# Patient Record
Sex: FEMALE | Race: WHITE | Employment: STUDENT | URBAN - NONMETROPOLITAN AREA
[De-identification: names, ages, dates, MRNs, and addresses within clinical notes are randomized per-mention and may not be internally consistent; named-entity substitution may affect disease eponyms.]

---
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
## 2025-01-31 ENCOUNTER — OFFICE VISIT (OUTPATIENT)
Dept: FAMILY MEDICINE CLINIC | Age: 20
End: 2025-01-31
Payer: COMMERCIAL

## 2025-01-31 VITALS
WEIGHT: 177 LBS | TEMPERATURE: 97.9 F | BODY MASS INDEX: 30.38 KG/M2 | HEART RATE: 92 BPM | DIASTOLIC BLOOD PRESSURE: 82 MMHG | OXYGEN SATURATION: 98 % | SYSTOLIC BLOOD PRESSURE: 110 MMHG

## 2025-01-31 DIAGNOSIS — R09.89 SYMPTOMS OF UPPER RESPIRATORY INFECTION (URI): Primary | ICD-10-CM

## 2025-01-31 PROCEDURE — 99213 OFFICE O/P EST LOW 20 MIN: CPT

## 2025-01-31 RX ORDER — ACETAMINOPHEN 500 MG
500 TABLET ORAL 4 TIMES DAILY PRN
Qty: 120 TABLET | Refills: 0 | Status: SHIPPED | OUTPATIENT
Start: 2025-01-31

## 2025-01-31 RX ORDER — FLUTICASONE PROPIONATE 50 MCG
1 SPRAY, SUSPENSION (ML) NASAL DAILY
Qty: 1 EACH | Refills: 0 | Status: SHIPPED | OUTPATIENT
Start: 2025-01-31

## 2025-01-31 RX ORDER — OXYMETAZOLINE HYDROCHLORIDE 0.05 G/100ML
2 SPRAY NASAL 2 TIMES DAILY
Qty: 1 EACH | Refills: 0 | Status: SHIPPED | OUTPATIENT
Start: 2025-01-31 | End: 2026-01-31

## 2025-01-31 RX ORDER — CETIRIZINE HYDROCHLORIDE 10 MG/1
10 TABLET ORAL DAILY
Qty: 30 TABLET | Refills: 0 | Status: SHIPPED | OUTPATIENT
Start: 2025-01-31

## 2025-01-31 RX ORDER — BENZONATATE 200 MG/1
200 CAPSULE ORAL 3 TIMES DAILY PRN
Qty: 30 CAPSULE | Refills: 0 | Status: SHIPPED | OUTPATIENT
Start: 2025-01-31 | End: 2025-02-10

## 2025-01-31 SDOH — ECONOMIC STABILITY: FOOD INSECURITY: WITHIN THE PAST 12 MONTHS, THE FOOD YOU BOUGHT JUST DIDN'T LAST AND YOU DIDN'T HAVE MONEY TO GET MORE.: NEVER TRUE

## 2025-01-31 SDOH — ECONOMIC STABILITY: FOOD INSECURITY: WITHIN THE PAST 12 MONTHS, YOU WORRIED THAT YOUR FOOD WOULD RUN OUT BEFORE YOU GOT MONEY TO BUY MORE.: NEVER TRUE

## 2025-01-31 ASSESSMENT — PATIENT HEALTH QUESTIONNAIRE - PHQ9
SUM OF ALL RESPONSES TO PHQ QUESTIONS 1-9: 0
1. LITTLE INTEREST OR PLEASURE IN DOING THINGS: NOT AT ALL
2. FEELING DOWN, DEPRESSED OR HOPELESS: NOT AT ALL
SUM OF ALL RESPONSES TO PHQ9 QUESTIONS 1 & 2: 0
SUM OF ALL RESPONSES TO PHQ QUESTIONS 1-9: 0

## 2025-01-31 ASSESSMENT — ENCOUNTER SYMPTOMS
WHEEZING: 0
CHOKING: 0
TROUBLE SWALLOWING: 0
SHORTNESS OF BREATH: 0
COUGH: 1
CHEST TIGHTNESS: 0
VOICE CHANGE: 0
RHINORRHEA: 0
SINUS PRESSURE: 0
APNEA: 0
SINUS PAIN: 0
SORE THROAT: 1

## 2025-01-31 NOTE — PROGRESS NOTES
Malathi Allison (:  2005) is a 19 y.o. female,Established patient, here for evaluation of the following chief complaint(s):  Fever (Chills, slight cough started yesterday but feels better today.)         Assessment & Plan  Symptoms of upper respiratory infection (URI)   Acute condition, new, Supportive care with appropriate antipyretics and fluids. 1 day of fever/chils, nonprouctive cough, already improving, noted sinus congestion, had not tried anything at this time. Suspecte viral URI, start symptomatic treatment only, if not improved in few days, consider augmentin for sinusitis. Clinical course discussed with patient that may take up to 6 weeks to recover from.    Orders:    cetirizine (ZYRTEC) 10 MG tablet; Take 1 tablet by mouth daily    benzonatate (TESSALON) 200 MG capsule; Take 1 capsule by mouth 3 times daily as needed for Cough    fluticasone (FLONASE) 50 MCG/ACT nasal spray; 1 spray by Each Nostril route daily    oxymetazoline (12 HOUR NASAL SPRAY) 0.05 % nasal spray; 2 sprays by Nasal route 2 times daily Do not use more than 3 days consecutively.    acetaminophen (TYLENOL) 500 MG tablet; Take 1 tablet by mouth 4 times daily as needed for Pain or Fever      Return if symptoms worsen or fail to improve.       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 Negative for activity change, appetite change, diaphoresis and fatigue.   HENT:  Positive for congestion, postnasal drip and sore throat. Negative for ear discharge, ear pain, rhinorrhea, sinus pressure, sinus pain, trouble swallowing and voice change.    Eyes:  Negative for visual disturbance.   Respiratory:  Positive for cough. Negative for apnea, choking, chest tightness,

## 2025-04-28 ENCOUNTER — HOSPITAL ENCOUNTER (EMERGENCY)
Age: 20
Discharge: HOME | End: 2025-04-28
Payer: COMMERCIAL

## 2025-04-28 VITALS
DIASTOLIC BLOOD PRESSURE: 81 MMHG | HEART RATE: 78 BPM | RESPIRATION RATE: 16 BRPM | SYSTOLIC BLOOD PRESSURE: 129 MMHG | TEMPERATURE: 97.9 F | OXYGEN SATURATION: 96 %

## 2025-04-28 DIAGNOSIS — H10.31 ACUTE BACTERIAL CONJUNCTIVITIS OF RIGHT EYE: Primary | ICD-10-CM

## 2025-04-28 PROCEDURE — 99203 OFFICE O/P NEW LOW 30 MIN: CPT

## 2025-04-28 PROCEDURE — 99213 OFFICE O/P EST LOW 20 MIN: CPT

## 2025-04-28 RX ORDER — ERYTHROMYCIN 5 MG/G
1 OINTMENT OPHTHALMIC EVERY 6 HOURS
Qty: 3.5 G | Refills: 0 | Status: SHIPPED | OUTPATIENT
Start: 2025-04-28

## 2025-04-28 ASSESSMENT — PAIN - FUNCTIONAL ASSESSMENT: PAIN_FUNCTIONAL_ASSESSMENT: NONE - DENIES PAIN

## 2025-04-28 ASSESSMENT — ENCOUNTER SYMPTOMS
SINUS PAIN: 0
APNEA: 0
TROUBLE SWALLOWING: 0
COUGH: 0
WHEEZING: 0
CHEST TIGHTNESS: 0
EYE ITCHING: 1
PHOTOPHOBIA: 0
SHORTNESS OF BREATH: 0
EYE DISCHARGE: 1
EYE REDNESS: 1
EYE PAIN: 0
SORE THROAT: 0
RHINORRHEA: 0
NAUSEA: 0
BACK PAIN: 0
VOMITING: 0
ABDOMINAL PAIN: 0
CONSTIPATION: 0
DIARRHEA: 0

## 2025-04-28 ASSESSMENT — VISUAL ACUITY: OU: 1

## 2025-04-28 NOTE — ED TRIAGE NOTES
Pt presents to urgent care with c/o RT eye irritation that started today. Pt states noticed eye was red around 1200. Pt denies drainage, matting, excessive watering, needing to blink more, double or blurry vision. Pt denies wearing contacts.

## 2025-04-28 NOTE — ED PROVIDER NOTES
[facility] URGENT CARE  Urgent Care Encounter       CHIEF COMPLAINT       Chief Complaint   Patient presents with    Eye Problem     RT       Nurses Notes reviewed and I agree except as noted in the HPI.  HISTORY OF PRESENT ILLNESS   Malathi Allison is a 19 y.o. female who presents to [facility] urgent care for evaluation of right eye redness that started today.  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  Pt denies any fever, chills, fatigue, SOB, CP, light-headedness or dizziness, numbness or tingling, abd pain, N/V/D, constipation or urinary complaints.  She reports that she does have issues with allergies at times.  Denies any pain in the eye.        The history is provided by the patient. No  was used.       REVIEW OF SYSTEMS     Review of Systems   Constitutional:  Negative for activity change, appetite change, chills, fatigue, fever and unexpected weight change.   HENT:  Negative for congestion, ear pain, hearing loss, mouth sores, nosebleeds, rhinorrhea, sinus pain, sneezing, sore throat, tinnitus and trouble swallowing.    Eyes:  Positive for discharge, redness and itching. Negative for photophobia, pain and visual disturbance.   Respiratory:  Negative for apnea, cough, chest tightness, shortness of breath and wheezing.    Cardiovascular:  Negative for chest pain and palpitations.   Gastrointestinal:  Negative for abdominal pain, constipation, diarrhea, nausea and vomiting.   Endocrine: Negative for cold intolerance, heat intolerance, polydipsia, polyphagia and polyuria.   Genitourinary:  Negative for difficulty urinating, dysuria, flank pain, frequency, hematuria, menstrual problem and urgency.   Musculoskeletal:  Negative for arthralgias, back pain, gait problem, joint swelling, neck pain and neck stiffness.   Skin:  Negative for rash and wound.   Neurological:  Negative for dizziness, tremors, seizures, speech difficulty, weakness, numbness

## 2025-04-28 NOTE — DISCHARGE INSTRUCTIONS
I sent in erythromycin ointment for you.     I recommend applying a warm compress prior to application of medication to help clean the area.     You can use warm compresses throughout the day for symptoms as well.     Monitor for worsening s/s and be re-evaluated if no improvement in 2-3 days.